# Patient Record
Sex: FEMALE | Race: OTHER
[De-identification: names, ages, dates, MRNs, and addresses within clinical notes are randomized per-mention and may not be internally consistent; named-entity substitution may affect disease eponyms.]

---

## 2018-12-10 VITALS
HEIGHT: 62 IN | RESPIRATION RATE: 16 BRPM | WEIGHT: 136.69 LBS | TEMPERATURE: 96 F | OXYGEN SATURATION: 100 % | HEART RATE: 61 BPM | SYSTOLIC BLOOD PRESSURE: 125 MMHG | DIASTOLIC BLOOD PRESSURE: 58 MMHG

## 2018-12-10 NOTE — ASU PREOP CHECKLIST - SELECT TESTS ORDERED
Hepatic Function/EKG/PT/PTT/CXR/CBC/INR/CMP CMP/Hepatic Function/INR/EKG/POCT Blood Glucose/CXR/PT/PTT/CBC

## 2018-12-11 ENCOUNTER — RESULT REVIEW (OUTPATIENT)
Age: 58
End: 2018-12-11

## 2018-12-11 ENCOUNTER — OUTPATIENT (OUTPATIENT)
Dept: OUTPATIENT SERVICES | Facility: HOSPITAL | Age: 58
LOS: 1 days | Discharge: HOME | End: 2018-12-11
Payer: COMMERCIAL

## 2018-12-11 VITALS
DIASTOLIC BLOOD PRESSURE: 85 MMHG | OXYGEN SATURATION: 100 % | TEMPERATURE: 97 F | RESPIRATION RATE: 17 BRPM | HEART RATE: 66 BPM | SYSTOLIC BLOOD PRESSURE: 148 MMHG

## 2018-12-11 DIAGNOSIS — Z98.891 HISTORY OF UTERINE SCAR FROM PREVIOUS SURGERY: Chronic | ICD-10-CM

## 2018-12-11 DIAGNOSIS — Z98.890 OTHER SPECIFIED POSTPROCEDURAL STATES: Chronic | ICD-10-CM

## 2018-12-11 LAB
GLUCOSE BLDC GLUCOMTR-MCNC: 66 MG/DL — LOW (ref 70–99)
GLUCOSE BLDC GLUCOMTR-MCNC: 75 MG/DL — SIGNIFICANT CHANGE UP (ref 70–99)
GLUCOSE BLDC GLUCOMTR-MCNC: 78 MG/DL — SIGNIFICANT CHANGE UP (ref 70–99)
GLUCOSE BLDC GLUCOMTR-MCNC: 86 MG/DL — SIGNIFICANT CHANGE UP (ref 70–99)

## 2018-12-11 PROCEDURE — 88305 TISSUE EXAM BY PATHOLOGIST: CPT

## 2018-12-11 PROCEDURE — 58558 HYSTEROSCOPY BIOPSY: CPT

## 2018-12-11 PROCEDURE — 82962 GLUCOSE BLOOD TEST: CPT

## 2018-12-11 RX ORDER — SODIUM CHLORIDE 9 MG/ML
1000 INJECTION, SOLUTION INTRAVENOUS
Qty: 0 | Refills: 0 | Status: DISCONTINUED | OUTPATIENT
Start: 2018-12-11 | End: 2018-12-11

## 2018-12-11 RX ORDER — METOCLOPRAMIDE HCL 10 MG
10 TABLET ORAL EVERY 6 HOURS
Qty: 0 | Refills: 0 | Status: DISCONTINUED | OUTPATIENT
Start: 2018-12-11 | End: 2018-12-11

## 2018-12-11 RX ORDER — ONDANSETRON 8 MG/1
4 TABLET, FILM COATED ORAL ONCE
Qty: 0 | Refills: 0 | Status: DISCONTINUED | OUTPATIENT
Start: 2018-12-11 | End: 2018-12-11

## 2018-12-11 NOTE — BRIEF OPERATIVE NOTE - OPERATION/FINDINGS
6 week size, anteverted uterus, uterus appeared bicornuate with L horn > in size than R horn; atrophic endometrium

## 2018-12-11 NOTE — BRIEF OPERATIVE NOTE - POST-OP DX
Abnormal uterine bleeding  12/11/2018    Guillaume Orr  Atrophic endometrium  12/11/2018    Guillaume Orr  Bicornuate uterus  12/11/2018    Active  Guillaume Rodriguez

## 2018-12-11 NOTE — PACU DISCHARGE NOTE - COMMENTS
Pt discharged to home. Discharge instructions given to pt. Iv heplock removed. Safety protocol maintained.

## 2018-12-11 NOTE — BRIEF OPERATIVE NOTE - PROCEDURE
<<-----Click on this checkbox to enter Procedure Dilatation and curettage  12/11/2018    Active  [email or web address]  Diagnostic hysteroscopy  12/11/2018    Active  [email or web address]

## 2018-12-13 LAB — SURGICAL PATHOLOGY STUDY: SIGNIFICANT CHANGE UP
